# Patient Record
(demographics unavailable — no encounter records)

---

## 2024-10-17 NOTE — DISCUSSION/SUMMARY
[FreeTextEntry1] : She will remain on rosuvastatin 40 mg daily, lisinopril 20 mg daily, metoprolol 25 mg daily, aspirin and Brilinta She plans to travel to [place] in 2 to 3 weeks when she will be there for 6 months. She will repeat the echo while in [place] and also a lipid profile. I will see her again next year when she returns from [place]. [EKG obtained to assist in diagnosis and management of assessed problem(s)] : EKG obtained to assist in diagnosis and management of assessed problem(s)

## 2024-10-17 NOTE — REASON FOR VISIT
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  After thrombectomy and stenting she did well without any recurrence had a moderate hematoma with some ecchymosis present today, but otherwise has remained asymptomatic. Echo performed prior to discharge showed an EF of 55%. She is on the aspirin and Brilinta.  She is also on lisinopril metoprolol and rosuvastatin

## 2025-05-29 NOTE — REASON FOR VISIT
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  After thrombectomy and stenting she did well without any recurrence had a moderate hematoma with some ecchymosis , but otherwise has remained asymptomatic. Echo performed prior to discharge showed an EF of 55%. She is on the aspirin and Brilinta.  She is also on lisinopril metoprolol and rosuvastatin The Brilinta was changed to Plavix metoprolol was discontinued and she remains on lisinopril 10 mg.  Also the rosuvastatin. 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 On the aspirin and Plavix she has some bruising. From a cardiac standpoint she is asymptomatic.

## 2025-05-29 NOTE — HISTORY OF PRESENT ILLNESS
[FreeTextEntry1] : She has no chest pain She has no shortness of breath She has no palpitations She has no syncope She is neurologically intact She has no edema She has no skin rashes
no

## 2025-05-29 NOTE — DISCUSSION/SUMMARY
[FreeTextEntry1] : She will remain on rosuvastatin 40 mg daily, lisinopril 5 mg daily,, aspirin and Plavix She is doing physical therapy for recovery of her hip replacement.  She will remain on the same medications and see me again in October, 5months at which point we will stop the Plavix. [EKG obtained to assist in diagnosis and management of assessed problem(s)] : EKG obtained to assist in diagnosis and management of assessed problem(s)